# Patient Record
Sex: FEMALE
[De-identification: names, ages, dates, MRNs, and addresses within clinical notes are randomized per-mention and may not be internally consistent; named-entity substitution may affect disease eponyms.]

---

## 2022-03-21 PROBLEM — Z00.00 ENCOUNTER FOR PREVENTIVE HEALTH EXAMINATION: Status: ACTIVE | Noted: 2022-03-21

## 2022-03-22 ENCOUNTER — APPOINTMENT (OUTPATIENT)
Dept: NEUROSURGERY | Facility: CLINIC | Age: 37
End: 2022-03-22
Payer: COMMERCIAL

## 2022-03-22 ENCOUNTER — NON-APPOINTMENT (OUTPATIENT)
Age: 37
End: 2022-03-22

## 2022-03-22 VITALS — BODY MASS INDEX: 30.83 KG/M2 | HEIGHT: 63 IN | WEIGHT: 174 LBS

## 2022-03-22 DIAGNOSIS — M54.50 LOW BACK PAIN, UNSPECIFIED: ICD-10-CM

## 2022-03-22 DIAGNOSIS — Z78.9 OTHER SPECIFIED HEALTH STATUS: ICD-10-CM

## 2022-03-22 PROCEDURE — 99204 OFFICE O/P NEW MOD 45 MIN: CPT

## 2022-03-22 NOTE — HISTORY OF PRESENT ILLNESS
[FreeTextEntry1] : LBP [de-identified] : This is a 37 yrs old female who presents today reporting of persistent LBP since 2021. Denies radicular leg pain, numbness, and tingling. She goes to a chiropractor who manipulates her spine and alleviated the pain temporarily. Symptoms is worse when laying down and lifting a heavy object. \par \par MRI of the lumbar spine w/o contrast done on 3/11/22 at [facility] showed L5-S1 disc dessication with a minor disc bulge, and mild right stenosis

## 2022-03-22 NOTE — END OF VISIT
[FreeTextEntry3] : ATTENDING ADDENDUM\par Patient seen and examined. Clinical history, imaging, and electronic medical record reviewed independently by me. Assessment and plan per ACP note above. DDD @ L5-S1, will refer for PT/Pain management; possible candidate for lumbar TDR if no improvement.\par \par Neurologic Exam/Physical Exam\par Alert & Oriented x 3\par CN2-12 grossly intact\par Sensation intact to light touch, pain/temperature intact\par Vibration/proprioception Intact\par Motor strength 5/5 in all extremities\par No pronator drift, FNF normal\par 2+ patellar, 2+ brachioradialis \par  [Time Spent: ___ minutes] : I have spent [unfilled] minutes of time on the encounter.